# Patient Record
Sex: MALE | Race: WHITE | URBAN - METROPOLITAN AREA
[De-identification: names, ages, dates, MRNs, and addresses within clinical notes are randomized per-mention and may not be internally consistent; named-entity substitution may affect disease eponyms.]

---

## 2020-07-25 ENCOUNTER — DOCUMENTATION ONLY (OUTPATIENT)
Dept: NEUROLOGY | Facility: HOSPITAL | Age: 45
End: 2020-07-25

## 2020-07-26 NOTE — PROGRESS NOTES
Discussed with the ED physician Dr. Guo at [facility].  45-year-old gentleman presents with onset of slurred speech, left-sided sensory and motor deficit approximately 5:10 p.m..  CT scan of the head performed which was reportedly unremarkable, no acute abnormalities.  He was treated with 5000 units IV heparin bolus.  He experienced improvement was slurred speech and motor deficit of but has persistent left-sided arm and leg paresthesias, with progressive numbness involving the left leg.  Blood pressure 140/93.    Unfortunately given the heparin and elapsed time he is not a candidate for IV tPA.  Per report no motor deficit, van negative.  Advised maintain cerebral perfusion, will transfer to [facility] for further stroke management and Neurology evaluation.